# Patient Record
(demographics unavailable — no encounter records)

---

## 2018-01-01 NOTE — NBDCN
===========================

Datetime: 2018 09:47

===========================

   

Nsy Prov Gen Appearance:  Within Normal Limits

Nsy Prov Skin:  Jaundice

Nsy Prov Neuro:  Normal Tone; [address]; Grasp; Root; Suck

Nsy Prov Musculoskeletal:  Within Normal Limits; Full Range of Motion; Spontaneous Movement All Extre
mities; Intact Clavicles; Clavicles without Crepitus; Gluteal Folds Symmetrical; Spine Within Normal 
Limits; No Sacral Dimple/Cyst

Nsy Prov Head:  Normal Fontanelles; Normocephalic; Sutures WNL

Nsy Prov EENT:  Mouth Within Normal Limits; Ears Within Normal Limits; Eyes Within Normal Limits; Eye
s Red Reflex Bilaterally; Nose Within Normal Limits; Face Within Normal Limits

Nsy Prov Cardiovascular:  Within Normal Limits; Normal Pulses

Nsy Prov Respiratory:  Within Normal Limits

Nsy Prov GI:  Within Normal Limits; Soft; Normal Liver; Non Palpable Spleen

Nsy Prov Umbilicus:  Within Normal Limits

Nsy Prov :  Normal Female Genitalia

Nsy Prov Discharge:  Discharge Home Today; Vital Signs Appropriate; Bonding Appropriately; Voiding an
d Stooling; Appropriate Weight Loss

Nsy Prov Disch Comments:  Late  (36 weeker) female NB by NVD doing well.

   Jaundice.  Mother A+.  Baby O+.  Ibrahima-.

   Bili before discharge at about 34 HRs of life = 7.

      

   Through :

   Condition of the baby and results of physical exam were addressed to the mother.  

   Care of the baby after discharge was discussed with the mother.  This included:  Safety, feeding a
nd nutrition, jaundice, skin care, umbilical area care, symptoms of well-being of the baby versus tho
se of possible serious baby illness, and the importance of close follow up with PMD.

   Mother concerns were addressed.

      

   Plan:  D/C home.  F/U with PMD in 2 days.

      

   33 minutes spent in discharging the baby.

      

   

===========================

Datetime: 2018 09:00

===========================

   

Infant Birthdate and Time:  2018 21:25

Infant Sex - 1:  Female

Gestational Age at Deliv:  36.3

Method of Delivery:  Vaginal

Vacuum Extraction:  N/A

Forceps:  N/A

Mother's Steroids Given:  None

Apgar Score 1, NB:  9

Apgar Score5, NB:  9

Maternal Amniotic Fluid Color:  Clear

Mother's Blood Type:  A Positive (Annotations: as per PNR)

Mother's Hepatitis B:  Negative

Mother's Gonorrhea:  Negative

Mother's Chlamydia:  Negative

Mother's RPR/VDRL:  Nonreactive

Mother's HIV+ Exposure Test MBL:  Negative

Mother's Hx Herpes:  No

Mother's Rubella:  Immune

Mother's Group Beta Strep:  Done, Result Unknown

Mother's Antibiotics # of Doses:  4

Admission Birthweight, NB:  2490

Infant Weight (lb) MBL:  5

Infant Weight (oz) MBL:  8

Maternal Feeding Preference:  Both

   

===========================

Datetime: 2018 08:59

===========================

   

Hearing Screen Status:  Hearing Screen Complete

Discharge Weight gms NB:  2450

Discharge Weight lbs NB:  5

Discharge Weight oz NB:  6

[place] Screenin2018 08:00

Congenital Heart Screen:  Negative, Congenital Heart Screen Complete

Follow up in Weeks NB:  2 Days

Disch Follow Up With:  [place] [facility] [phone]

Follow up Appt with NB:  Pediatrician

   

===========================

Datetime: 2018 08:00

===========================

   

Length cms, NB:  46.50

Length in, NB:  18.31

Head Circumference (cm), NB:  32.50

   

===========================

Datetime: 2018 04:00

===========================

   

Formula Type:  Similac Sensitive

   

===========================

Datetime: 2018 20:00

===========================

   

Hepatitis B Vaccine NB:  2018 00:00

   

===========================

Datetime: 2018 19:25

===========================

   

Hearing Screen Result, NB:  Right Ear Pass; Left Ear Pass

   

===========================

Datetime: 2018 04:00

===========================

   

Blood Type:  O Positive

Lab, Direct Ibrahima:  Negative

   

===========================

Datetime: 2018 23:10

===========================

   

Chest Circumference, NB:  29.50